# Patient Record
Sex: MALE | Race: WHITE | Employment: FULL TIME | URBAN - METROPOLITAN AREA
[De-identification: names, ages, dates, MRNs, and addresses within clinical notes are randomized per-mention and may not be internally consistent; named-entity substitution may affect disease eponyms.]

---

## 2017-12-20 ENCOUNTER — OFFICE VISIT (OUTPATIENT)
Dept: FAMILY MEDICINE | Facility: CLINIC | Age: 32
End: 2017-12-20
Payer: COMMERCIAL

## 2017-12-20 VITALS
WEIGHT: 210 LBS | BODY MASS INDEX: 29.4 KG/M2 | SYSTOLIC BLOOD PRESSURE: 122 MMHG | TEMPERATURE: 98.5 F | HEIGHT: 71 IN | OXYGEN SATURATION: 98 % | RESPIRATION RATE: 18 BRPM | HEART RATE: 90 BPM | DIASTOLIC BLOOD PRESSURE: 82 MMHG

## 2017-12-20 DIAGNOSIS — M23.8X2 ACL LAXITY, LEFT: ICD-10-CM

## 2017-12-20 DIAGNOSIS — M23.92 INTERNAL DERANGEMENT OF KNEE, LEFT: Primary | ICD-10-CM

## 2017-12-20 PROCEDURE — 99214 OFFICE O/P EST MOD 30 MIN: CPT | Performed by: PHYSICIAN ASSISTANT

## 2017-12-20 RX ORDER — NAPROXEN 500 MG/1
500 TABLET ORAL 2 TIMES DAILY WITH MEALS
Qty: 60 TABLET | Refills: 1 | Status: SHIPPED | OUTPATIENT
Start: 2017-12-20 | End: 2019-03-13

## 2017-12-20 ASSESSMENT — PAIN SCALES - GENERAL: PAINLEVEL: NO PAIN (0)

## 2017-12-20 NOTE — NURSING NOTE
"Chief Complaint   Patient presents with     Musculoskeletal Problem     knee pain        Initial /82  Pulse 90  Temp 98.5  F (36.9  C) (Tympanic)  Resp 18  Ht 5' 11\" (1.803 m)  Wt 210 lb (95.3 kg)  SpO2 98%  BMI 29.29 kg/m2 Estimated body mass index is 29.29 kg/(m^2) as calculated from the following:    Height as of this encounter: 5' 11\" (1.803 m).    Weight as of this encounter: 210 lb (95.3 kg).  BP completed using cuff size: neville Padilla MA      "

## 2017-12-20 NOTE — PROGRESS NOTES
"  SUBJECTIVE:   Henrry Dewey is a 32 year old male who presents to clinic today for the following health issues:      Joint Pain \"inside left knee\"     Onset: 3-4 months     Description:   Location: left knee  Character: Sharp, Dull ache and Stabbing    Intensity: moderate    Progression of Symptoms: same    Accompanying Signs & Symptoms:  Other symptoms: none    History:   Previous similar pain: no       Precipitating factors:     Trauma or overuse: YES- thinks at softball 4 months ago, he was going for a ball in the infield and planted his left foot and twisted laterally.  He did not appreciate a joint effusion at all, but noted this pain that increases mainly with flexion and with long days on his feet.  He does have prominent tibial tuberosities and does have some pain with kneeling but this is not different from previous to the injury.  This is quite aggravating.  Is not using any medications for the pain.  States he wears good shoes with insoles at his job as an  mainly works in industrial/commercial settings.  Devious to this did not have significant issue with his knees other than the prominent tibial tuberosities in fact did have an MRI on the left knee in 2006 due to increased pain here-was essentially benign other than the tuberosity noted.          Alleviating factors:  Improved by: nothing    Therapies Tried and outcome: ibuprofen               Problem list and histories reviewed & adjusted, as indicated.  Additional history: as documented    Patient Active Problem List   Diagnosis     CARDIOVASCULAR SCREENING; LDL GOAL LESS THAN 160     ACL laxity, left     Internal derangement of knee, left     No past surgical history on file.    Social History   Substance Use Topics     Smoking status: Never Smoker     Smokeless tobacco: Not on file     Alcohol use No     No family history on file.          Reviewed and updated as needed this visit by clinical staff     Reviewed and updated as needed " "this visit by Provider         ROS:  Constitutional, HEENT, cardiovascular, pulmonary, gi and gu systems are negative, except as otherwise noted.      OBJECTIVE:   /82  Pulse 90  Temp 98.5  F (36.9  C) (Tympanic)  Resp 18  Ht 5' 11\" (1.803 m)  Wt 210 lb (95.3 kg)  SpO2 98%  BMI 29.29 kg/m2  Body mass index is 29.29 kg/(m^2).   GENERAL: healthy, alert and no distress  Exam of the knee is unremarkable today. No effusion. No warmth or redness. Varus/valgus stressing is negative. Lachmans positive anterior with laxity noted left side, but not on right. No clicking/crepitance noted. No pain over menisci/ligament structures. Increased pain within the inside of knee with flexion. Extension - no pain.     Right knee exam is unremarkable.       Diagnostic Test Results:  MRI is set up.    ASSESSMENT:      Internal derangement of knee, left  ACL laxity, left      PLAN:       ICD-10-CM    1. Internal derangement of knee, left M23.92 naproxen (NAPROSYN) 500 MG tablet     MR Knee Left w/o Contrast   2. ACL laxity, left M23.8X2 naproxen (NAPROSYN) 500 MG tablet     MR Knee Left w/o Contrast           MEDICATIONS:        - Trial of Naprosyn 500 mg twice daily-would like him to do this consistently for the next couple of weeks to see if it helps his symptoms.  FURTHER TESTING:       - MRI left knee ordered and pending.  CONSULTATION/REFERRAL to orthopedics versus physical therapy depending on MRI results.  We will contact him as soon as I see the results.    Padmini Mehta PA-C  [facility]  Orders Placed This Encounter     MR Knee Left w/o Contrast     naproxen (NAPROSYN) 500 MG tablet       AVS given to patient upon discharge today.  Electronically signed by Padmini Mehta PA-C  December 20, 2017  3:21 PM      "

## 2017-12-20 NOTE — MR AVS SNAPSHOT
After Visit Summary   12/20/2017    Henrry Dewey    MRN: [id number]           Patient Information     Date Of Birth          1985        Visit Information        Provider Department      12/20/2017 1:30 PM Padmini Mehta PA-C [facility]        Today's Diagnoses     Internal derangement of knee, left    -  1    ACL laxity, left           Follow-ups after your visit        Your next 10 appointments already scheduled     Dec 22, 2017  5:45 PM CST   (Arrive by 5:30 PM)   MR KNEE LEFT W/O CONTRAST with PHMR1   [facility] ([facility])    [address] [address]   [phone]           Take your medicines as usual, unless your doctor tells you not to. Bring a list of your current medicines to your exam (including vitamins, minerals and over-the-counter drugs). Also bring the results of similar scans you may have had.  Please remove any body piercings and hair extensions before you arrive.  Follow your doctor s orders. If you do not, we may have to postpone your exam.  You will not have contrast for this exam. You do not need to do anything special to prepare.  The MRI machine uses a strong magnet. Please wear clothes without metal (snaps, zippers). A sweatsuit works well, or we may give you a hospital gown.   **IMPORTANT** THE INSTRUCTIONS BELOW ARE ONLY FOR THOSE PATIENTS WHO HAVE BEEN TOLD THEY WILL RECEIVE SEDATION OR GENERAL ANESTHESIA DURING THEIR MRI PROCEDURE:  IF YOU WILL RECEIVE SEDATION (take medicine to help you relax during your exam):   You must get the medicine from your doctor before you arrive. Bring the medicine to the exam. Do not take it at home.   Arrive one hour early. Bring someone who can take you home after the test. Your medicine will make you sleepy. After the exam, you may not drive, take a bus or take a taxi by yourself.   No eating 8 hours before your exam. You may have clear liquids up until 4 hours  "before your exam. (Clear liquids include water, clear tea, black coffee and fruit juice without pulp.)  IF YOU WILL RECEIVE ANESTHESIA (be asleep for your exam):   Arrive 1 1/2 hours early. Bring someone who can take you home after the test. You may not drive, take a bus or take a taxi by yourself.   No eating 8 hours before your exam. You may have clear liquids up until 4 hours before your exam. (Clear liquids include water, clear tea, black coffee and fruit juice without pulp.)   You will spend four to five hours in the recovery room.  Please call the Imaging Department at your exam site with any questions.              Future tests that were ordered for you today     Open Future Orders        Priority Expected Expires Ordered    MR Knee Left w/o Contrast Routine  12/20/2018 12/20/2017            Who to contact     If you have questions or need follow up information about today's clinic visit or your schedule please contact [facility] directly at [phone].  Normal or non-critical lab and imaging results will be communicated to you by [employer], letter or phone within 4 business days after the clinic has received the results. If you do not hear from us within 7 days, please contact the clinic through [employer] or phone. If you have a critical or abnormal lab result, we will notify you by phone as soon as possible.  Submit refill requests through [employer] or call your pharmacy and they will forward the refill request to us. Please allow 3 business days for your refill to be completed.          Additional Information About Your Visit        [employer] Information     [employer] lets you send messages to your doctor, view your test results, renew your prescriptions, schedule appointments and more. To sign up, go to [email or web address] . Click on \"Log in\" on the left side of the screen, which will take you to the Welcome page. Then click on \"Sign up Now\" on the right side of the page.     You will be " "asked to enter the access code listed below, as well as some personal information. Please follow the directions to create your username and password.     Your access code is: [email or web address]  Expires: 3/20/2018  4:26 PM     Your access code will  in 90 days. If you need help or a new code, please call your [place] clinic or [phone].        Care EveryWhere ID     This is your Care EveryWhere ID. This could be used by other organizations to access your [place] medical records  [id number]        Your Vitals Were     Pulse Temperature Respirations Height Pulse Oximetry BMI (Body Mass Index)    90 98.5  F (36.9  C) (Tympanic) 18 5' 11\" (1.803 m) 98% 29.29 kg/m2       Blood Pressure from Last 3 Encounters:   17 122/82   16 110/70   12 112/72    Weight from Last 3 Encounters:   17 210 lb (95.3 kg)   16 211 lb 12 oz (96 kg)   12 210 lb 3.2 oz (95.3 kg)                 Today's Medication Changes          These changes are accurate as of: 17  4:26 PM.  If you have any questions, ask your nurse or doctor.               Start taking these medicines.        Dose/Directions    naproxen 500 MG tablet   Commonly known as:  NAPROSYN   Used for:  Internal derangement of knee, left, ACL laxity, left   Started by:  Padmini Mehta PA-C        Dose:  500 mg   Take 1 tablet (500 mg) by mouth 2 times daily (with meals)   Quantity:  60 tablet   Refills:  1            Where to get your medicines      These medications were sent to [facility] - [address] [address]     Phone:  [phone]     naproxen 500 MG tablet                Primary Care Provider Fax #    Physician No Ref-Primary [phone]       No address on file        Equal Access to Services     [facility]: Josef Duron, waaxda luqadaha, qaybta kaaljeferson shepard. [facility] [phone].    ATENCIÓN: Si hua anthony, " tiene a alford disposición servicios gratuitos de asistencia lingüística. Colby madrigal [phone].    We comply with applicable federal civil rights laws and [place] laws. We do not discriminate on the basis of race, color, national origin, age, disability, sex, sexual orientation, or gender identity.            Thank you!     Thank you for choosing [facility]  for your care. Our goal is always to provide you with excellent care. Hearing back from our patients is one way we can continue to improve our services. Please take a few minutes to complete the written survey that you may receive in the mail after your visit with us. Thank you!             Your Updated Medication List - Protect others around you: Learn how to safely use, store and throw away your medicines at www.disposemymeds.org.          This list is accurate as of: 12/20/17  4:26 PM.  Always use your most recent med list.                   Brand Name Dispense Instructions for use Diagnosis    naproxen 500 MG tablet    NAPROSYN    60 tablet    Take 1 tablet (500 mg) by mouth 2 times daily (with meals)    Internal derangement of knee, left, ACL laxity, left

## 2017-12-22 ENCOUNTER — HOSPITAL ENCOUNTER (OUTPATIENT)
Dept: MRI IMAGING | Facility: CLINIC | Age: 32
Discharge: HOME | End: 2017-12-22
Attending: PHYSICIAN ASSISTANT | Admitting: PHYSICIAN ASSISTANT
Payer: COMMERCIAL

## 2017-12-22 DIAGNOSIS — M23.92 INTERNAL DERANGEMENT OF KNEE, LEFT: ICD-10-CM

## 2017-12-22 DIAGNOSIS — M23.8X2 ACL LAXITY, LEFT: ICD-10-CM

## 2017-12-22 PROCEDURE — 73721 MRI JNT OF LWR EXTRE W/O DYE: CPT | Mod: LT

## 2017-12-26 NOTE — PROGRESS NOTES
Please let him know that his MRI does not show any internal derangement of the knee and in fact it is unchanged from a previous MRI in 2006.  This is great news although I know this problem has been affecting him with his work.  Could meet with physical therapy to work on strengthening the muscles around the knee which may help with some of the squatting and different maneuvering he has to do with his job.  I am happy to approve an order for PT if he would like to move forward with this.

## 2019-03-13 ENCOUNTER — OFFICE VISIT (OUTPATIENT)
Dept: FAMILY MEDICINE | Facility: OTHER | Age: 34
End: 2019-03-13
Payer: COMMERCIAL

## 2019-03-13 VITALS
SYSTOLIC BLOOD PRESSURE: 126 MMHG | BODY MASS INDEX: 29.26 KG/M2 | RESPIRATION RATE: 16 BRPM | HEIGHT: 71 IN | OXYGEN SATURATION: 98 % | DIASTOLIC BLOOD PRESSURE: 84 MMHG | TEMPERATURE: 98.2 F | WEIGHT: 209 LBS | HEART RATE: 92 BPM

## 2019-03-13 DIAGNOSIS — R39.15 URGENCY OF URINATION: Primary | ICD-10-CM

## 2019-03-13 DIAGNOSIS — R35.0 URINARY FREQUENCY: ICD-10-CM

## 2019-03-13 DIAGNOSIS — R39.15 URINARY URGENCY: ICD-10-CM

## 2019-03-13 LAB
ALBUMIN UR-MCNC: NEGATIVE MG/DL
APPEARANCE UR: CLEAR
BILIRUB UR QL STRIP: NEGATIVE
COLOR UR AUTO: YELLOW
GLUCOSE UR STRIP-MCNC: NEGATIVE MG/DL
HGB UR QL STRIP: NEGATIVE
KETONES UR STRIP-MCNC: NEGATIVE MG/DL
LEUKOCYTE ESTERASE UR QL STRIP: ABNORMAL
NITRATE UR QL: NEGATIVE
NON-SQ EPI CELLS #/AREA URNS LPF: NORMAL /LPF
PH UR STRIP: 6 PH (ref 5–7)
PSA SERPL-ACNC: 2.04 UG/L (ref 0–4)
RBC #/AREA URNS AUTO: NORMAL /HPF
SOURCE: ABNORMAL
SP GR UR STRIP: 1.02 (ref 1–1.03)
UROBILINOGEN UR STRIP-ACNC: 0.2 EU/DL (ref 0.2–1)
WBC #/AREA URNS AUTO: NORMAL /HPF

## 2019-03-13 PROCEDURE — 36415 COLL VENOUS BLD VENIPUNCTURE: CPT | Performed by: NURSE PRACTITIONER

## 2019-03-13 PROCEDURE — G0103 PSA SCREENING: HCPCS | Performed by: NURSE PRACTITIONER

## 2019-03-13 PROCEDURE — 99213 OFFICE O/P EST LOW 20 MIN: CPT | Performed by: NURSE PRACTITIONER

## 2019-03-13 PROCEDURE — 81001 URINALYSIS AUTO W/SCOPE: CPT | Performed by: NURSE PRACTITIONER

## 2019-03-13 ASSESSMENT — MIFFLIN-ST. JEOR: SCORE: 1920.15

## 2019-03-13 ASSESSMENT — PAIN SCALES - GENERAL: PAINLEVEL: NO PAIN (0)

## 2019-03-13 NOTE — PROGRESS NOTES
SUBJECTIVE:   Henrry Dewey is a 33 year old male who presents to clinic today for the following health issues:      Genitourinary symptoms      Duration:  Off and on 9 months     Description:  frequency and hesitancy    Intensity:  moderate, severe    Accompanying signs and symptoms (fever/discharge/nausea/vomiting/back or abdominal pain):  None    History (frequent UTI's/kidney stones/prostate problems): None  Sexually active: YES    Precipitating or alleviating factors: None    Therapies tried and outcome: none   Outcome: Not getting better     No dysuria, no hematuria.    Has frequency and urgency.  No incontinence  No penile pain, discharge, no testicular pain or swelling  He has no concerns over STD exposure  No fevers/chills.  Has not felt ill.     Drinks about 1 energy drink daily.  Does take Excedrin for headaches about 2-3 times a week on average.     Problem list and histories reviewed & adjusted, as indicated.  Additional history: as documented    Patient Active Problem List   Diagnosis     CARDIOVASCULAR SCREENING; LDL GOAL LESS THAN 160     ACL laxity, left     Internal derangement of knee, left     History reviewed. No pertinent surgical history.    Social History     Tobacco Use     Smoking status: Never Smoker     Smokeless tobacco: Never Used   Substance Use Topics     Alcohol use: No     History reviewed. No pertinent family history.      No current outpatient medications on file.     Allergies   Allergen Reactions     Penicillins      BP Readings from Last 3 Encounters:   03/13/19 126/84   12/20/17 122/82   12/07/16 110/70    Wt Readings from Last 3 Encounters:   03/13/19 94.8 kg (209 lb)   12/20/17 95.3 kg (210 lb)   12/07/16 96 kg (211 lb 12 oz)                    Reviewed and updated as needed this visit by clinical staff  Allergies  Meds  Med Hx  Surg Hx  Fam Hx  Soc Hx      Reviewed and updated as needed this visit by Provider         ROS:  Constitutional, HEENT, cardiovascular,  "pulmonary, gi and gu systems are negative, except as otherwise noted.    OBJECTIVE:     /84 (BP Location: Left arm, Patient Position: Sitting, Cuff Size: Adult Regular)   Pulse 92   Temp 98.2  F (36.8  C) (Temporal)   Resp 16   Ht 1.803 m (5' 11\")   Wt 94.8 kg (209 lb)   SpO2 98%   BMI 29.15 kg/m    Body mass index is 29.15 kg/m .  GENERAL: healthy, alert and no distress  NECK: no adenopathy, no asymmetry, masses, or scars and thyroid normal to palpation  RESP: lungs clear to auscultation - no rales, rhonchi or wheezes  CV: regular rate and rhythm, normal S1 S2, no S3 or S4, no murmur, click or rub, no peripheral edema and peripheral pulses strong  ABDOMEN: soft, nontender, no hepatosplenomegaly, no masses and bowel sounds normal  MS: no gross musculoskeletal defects noted, no edema    Diagnostic Test Results:  Results for orders placed or performed in visit on 03/13/19 (from the past 24 hour(s))   *UA reflex to Microscopic and Culture ([place] and [facility] (except [place] and [place])   Result Value Ref Range    Color Urine Yellow     Appearance Urine Clear     Glucose Urine Negative NEG^Negative mg/dL    Bilirubin Urine Negative NEG^Negative    Ketones Urine Negative NEG^Negative mg/dL    Specific Gravity Urine 1.020 1.003 - 1.035    Blood Urine Negative NEG^Negative    pH Urine 6.0 5.0 - 7.0 pH    Protein Albumin Urine Negative NEG^Negative mg/dL    Urobilinogen Urine 0.2 0.2 - 1.0 EU/dL    Nitrite Urine Negative NEG^Negative    Leukocyte Esterase Urine Trace (A) NEG^Negative    Source Unspecified Urine    Urine Microscopic   Result Value Ref Range    WBC Urine 0 - 5 OTO5^0 - 5 /HPF    RBC Urine O - 2 OTO2^O - 2 /HPF    Squamous Epithelial /LPF Urine Few FEW^Few /LPF   PSA, screen   Result Value Ref Range    PSA 2.04 0 - 4 ug/L       ASSESSMENT/PLAN:         1. Urgency of urination  I advised he stop all caffeine use.  Labs normal today.  If symptoms fail to improve, see Urology for further " evaluation.   - *UA reflex to Microscopic and Culture ([place] and [facility] (except [place] and [place])  - Urine Microscopic    2. Urinary urgency  - PSA, screen  - UROLOGY ADULT REFERRAL    3. Urinary frequency  - UROLOGY ADULT REFERRAL    See Patient Instructions    HAYDER Murphy [facility]

## 2019-03-13 NOTE — PATIENT INSTRUCTIONS
Stop all caffeine use.       Labs will be done today.   For normal results, you will receive a letter with the results in about 2 weeks.  If anything is abnormal or unexpected, someone from the clinic will call you.

## 2019-03-14 ENCOUNTER — TELEPHONE (OUTPATIENT)
Dept: FAMILY MEDICINE | Facility: OTHER | Age: 34
End: 2019-03-14

## 2019-03-14 NOTE — TELEPHONE ENCOUNTER
----- Message from HAYDER Murphy CNP sent at 3/14/2019 10:23 AM CDT -----  Please call patient and let him know his labs were normal.  I would like him to see the Urologist unless his symptoms resolve with stopping all caffeine use.  Referral is placed.   Electronically signed by Kayy Anthony CNP.

## 2020-10-20 ENCOUNTER — OFFICE VISIT (OUTPATIENT)
Dept: FAMILY MEDICINE | Facility: CLINIC | Age: 35
End: 2020-10-20
Payer: COMMERCIAL

## 2020-10-20 VITALS
SYSTOLIC BLOOD PRESSURE: 124 MMHG | OXYGEN SATURATION: 99 % | TEMPERATURE: 98.2 F | WEIGHT: 213.6 LBS | HEART RATE: 72 BPM | HEIGHT: 70 IN | DIASTOLIC BLOOD PRESSURE: 70 MMHG | BODY MASS INDEX: 30.58 KG/M2 | RESPIRATION RATE: 16 BRPM

## 2020-10-20 DIAGNOSIS — Z11.3 SCREEN FOR STD (SEXUALLY TRANSMITTED DISEASE): Primary | ICD-10-CM

## 2020-10-20 PROCEDURE — 99000 SPECIMEN HANDLING OFFICE-LAB: CPT | Performed by: PHYSICIAN ASSISTANT

## 2020-10-20 PROCEDURE — 36415 COLL VENOUS BLD VENIPUNCTURE: CPT | Performed by: PHYSICIAN ASSISTANT

## 2020-10-20 PROCEDURE — 86695 HERPES SIMPLEX TYPE 1 TEST: CPT | Performed by: PHYSICIAN ASSISTANT

## 2020-10-20 PROCEDURE — 86780 TREPONEMA PALLIDUM: CPT | Mod: 90 | Performed by: PHYSICIAN ASSISTANT

## 2020-10-20 PROCEDURE — 99213 OFFICE O/P EST LOW 20 MIN: CPT | Performed by: PHYSICIAN ASSISTANT

## 2020-10-20 ASSESSMENT — PAIN SCALES - GENERAL: PAINLEVEL: NO PAIN (0)

## 2020-10-20 ASSESSMENT — MIFFLIN-ST. JEOR: SCORE: 1924.1

## 2020-10-20 NOTE — PROGRESS NOTES
"Subjective     Henrry Dewey is a 34 year old male who presents to clinic today for the following health issues:    HPI         Concern - STD testing  Onset:   Description: STD testing  Intensity:   Progression of Symptoms:    Accompanying Signs & Symptoms: none  Previous history of similar problem: YES  Precipitating factors:        Worsened by:   Alleviating factors:        Improved by:   Therapies tried and outcome:     Patient is a 34 year old male who presents today with concerns about STD screen. He says that he does not believe that he has been exposed to STD, is in a monogamous relationship and is asymptomatic. He says that he is concerned because his significant other has been having abnormal uterine bleeding and is being swabbed today by her PCP for infectious causes.       Review of Systems   Constitutional, HEENT, cardiovascular, pulmonary, gi and gu systems are negative, except as otherwise noted.      Objective    /70 (BP Location: Right arm, Patient Position: Chair, Cuff Size: Adult Large)   Pulse 72   Temp 98.2  F (36.8  C) (Temporal)   Resp 16   Ht 1.784 m (5' 10.25\")   Wt 96.9 kg (213 lb 9.6 oz)   SpO2 99%   BMI 30.43 kg/m    Body mass index is 30.43 kg/m .  Physical Exam   GENERAL: healthy, alert and no distress  RESP: lungs clear to auscultation - no rales, rhonchi or wheezes  CV: regular rate and rhythm, normal S1 S2, no S3 or S4, no murmur, click or rub, no peripheral edema and peripheral pulses strong  MS: no gross musculoskeletal defects noted, no edema  PSYCH: mentation appears normal, affect normal/bright    No results found for this or any previous visit (from the past 24 hour(s)).        Assessment & Plan     Screen for STD (sexually transmitted disease)  Patient is asymptomatic. Will defer G/C testing at this time. Reviewed signs/symptoms to monitor for. Patient will call if symptoms develop or if significant other is diagnosed with an STD.   - Herpes Simplex Virus 1 and 2 " IgG  - Treponema Abs w Reflex to RPR and Titer         Return in about 6 months (around 4/20/2021) for Return for scheduled annual checkup with PCP.    ESTUARDO Hough [facility]

## 2020-10-20 NOTE — LETTER
October 22, 2020      Henrry Dewey  [address]  [place] [address]        Dear ,    We are writing to inform you of your test results.    The results of your lab work returned negative for infections. Please follow up for an annual checkup in the next 6-12 months or sooner if needed.     Resulted Orders   Herpes Simplex Virus 1 and 2 IgG   Result Value Ref Range    Herpes Simplex Virus Type 1 IgG <0.2 0.0 - 0.8 AI      Comment:      No HSV-1 IgG antibodies detected.  Antibody index (AI) values reflect qualitative changes in antibody   concentration that cannot be directly associated with clinical condition or   disease state.      Herpes Simplex Virus Type 2 IgG <0.2 0.0 - 0.8 AI      Comment:      No HSV-2 IgG antibodies detected.  Antibody index (AI) values reflect qualitative changes in antibody   concentration that cannot be directly associated with clinical condition or   disease state.     Treponema Abs w Reflex to RPR and Titer   Result Value Ref Range    Treponema Antibodies Nonreactive NR^Nonreactive      Comment:      Methodology Change: Test performed on the [employer] Liaison XL by Treponema   pallidum Total Antibodies Assay as of 3.17.2020.       If you have any questions or concerns, please call the clinic at the number listed above.     Sincerely,    James Hoffman PA-C

## 2020-10-20 NOTE — NURSING NOTE
Health Maintenance Due   Topic Date Due     HIV SCREENING  10/24/2001     PREVENTIVE CARE VISIT  11/13/2004     INFLUENZA VACCINE (1) 09/01/2020     Brianna MACIEL LPN

## 2020-10-21 LAB
HSV1 IGG SERPL QL IA: <0.2 AI (ref 0–0.8)
HSV2 IGG SERPL QL IA: <0.2 AI (ref 0–0.8)
T PALLIDUM AB SER QL: NONREACTIVE

## 2021-08-28 ENCOUNTER — HEALTH MAINTENANCE LETTER (OUTPATIENT)
Age: 36
End: 2021-08-28

## 2021-10-23 ENCOUNTER — HEALTH MAINTENANCE LETTER (OUTPATIENT)
Age: 36
End: 2021-10-23

## 2022-10-09 ENCOUNTER — HEALTH MAINTENANCE LETTER (OUTPATIENT)
Age: 37
End: 2022-10-09

## 2023-10-28 ENCOUNTER — HEALTH MAINTENANCE LETTER (OUTPATIENT)
Age: 38
End: 2023-10-28